# Patient Record
Sex: FEMALE | Race: WHITE | NOT HISPANIC OR LATINO | Employment: OTHER | URBAN - NONMETROPOLITAN AREA
[De-identification: names, ages, dates, MRNs, and addresses within clinical notes are randomized per-mention and may not be internally consistent; named-entity substitution may affect disease eponyms.]

---

## 2017-03-24 ENCOUNTER — OFFICE VISIT (OUTPATIENT)
Dept: FAMILY MEDICINE CLINIC | Facility: CLINIC | Age: 77
End: 2017-03-24

## 2017-03-24 VITALS
HEART RATE: 74 BPM | BODY MASS INDEX: 19.83 KG/M2 | SYSTOLIC BLOOD PRESSURE: 112 MMHG | WEIGHT: 119 LBS | DIASTOLIC BLOOD PRESSURE: 78 MMHG | HEIGHT: 65 IN | OXYGEN SATURATION: 98 %

## 2017-03-24 DIAGNOSIS — M25.511 CHRONIC RIGHT SHOULDER PAIN: ICD-10-CM

## 2017-03-24 DIAGNOSIS — G89.29 CHRONIC RIGHT SHOULDER PAIN: ICD-10-CM

## 2017-03-24 DIAGNOSIS — R60.0 LEG EDEMA, LEFT: ICD-10-CM

## 2017-03-24 DIAGNOSIS — M54.2 NECK PAIN: Primary | ICD-10-CM

## 2017-03-24 PROCEDURE — 99203 OFFICE O/P NEW LOW 30 MIN: CPT | Performed by: FAMILY MEDICINE

## 2017-03-25 PROBLEM — R60.0 LEG EDEMA, LEFT: Status: ACTIVE | Noted: 2017-03-25

## 2017-03-25 PROBLEM — M25.511 CHRONIC RIGHT SHOULDER PAIN: Status: ACTIVE | Noted: 2017-03-25

## 2017-03-25 PROBLEM — G89.29 CHRONIC RIGHT SHOULDER PAIN: Status: ACTIVE | Noted: 2017-03-25

## 2017-03-25 PROBLEM — Z85.07 H/O PANCREATIC CANCER: Status: ACTIVE | Noted: 2017-03-25

## 2017-03-25 PROBLEM — Z85.79 HISTORY OF LYMPHOMA: Status: ACTIVE | Noted: 2017-03-25

## 2017-03-25 RX ORDER — CHLORAL HYDRATE 500 MG
1000 CAPSULE ORAL
COMMUNITY

## 2017-03-25 NOTE — PROGRESS NOTES
"Subjective   Veena Gomez is a 76 y.o. female.     History of Present Illness  Mrs. Gomez presents today to establish care. She has not had a local primary care provider. She lives in [place] for 5 months out of the year and in [place] the remainder.  She has h/o lymphoma and subsequent dx of pancreatic cancer summer 2016. She underwent partial pancreatectomy and chemo-tx. She was tx'd at [facility]. Other care has been received while in [place].  Last chemo was late summer 2016. Last labs done at that time.  She has f/u CT and visit with oncology schedule for May this year in [place].    Her main concerns today are neck and shoulder pain. She c/o left sided/posterior neck pain. Aching, sometimes sharp. Moderate, sometimes severe. Seems worse after 1st waking up. Head feels \"too heavy\". Denies radiation of pain. She suspects she may have hurt it during fall at her home last fall. She bend down to go outside under the garage door, lost balance. She did not seek medical care at that time. No LOC.     In addition she has \"re-aggravated\" her right shoulder. Has ho severe OA and ?RTC tear. Is s/p distant surgery (unknown type). Has burning, tearing pain with lifting any weight, trying to raise above shoulder level with any weight. Arm feels weak due to pain. Pain radiates into upper arm.  SHe uses heat which helps some. Is afraid to take tylenol or NSAIDs.    The following portions of the patient's history were reviewed and updated as appropriate: allergies, current medications, past family history, past medical history, past social history, past surgical history and problem list.    Review of Systems   Constitutional: Positive for unexpected weight change. Negative for appetite change, fatigue and fever.   HENT: Positive for hearing loss. Negative for congestion, ear pain, nosebleeds, rhinorrhea, sneezing, sore throat, tinnitus and trouble swallowing.    Eyes: Negative for pain, discharge, redness and visual " disturbance.   Respiratory: Negative for cough, chest tightness, shortness of breath and wheezing.    Cardiovascular: Negative for chest pain, palpitations and leg swelling.   Gastrointestinal: Negative for abdominal pain, blood in stool, constipation, diarrhea, nausea and vomiting.   Endocrine: Positive for cold intolerance. Negative for heat intolerance, polydipsia and polyuria.   Genitourinary: Negative for dysuria, flank pain, frequency, hematuria and urgency.   Musculoskeletal: Positive for arthralgias, myalgias and neck pain. Negative for back pain and joint swelling.   Skin: Negative for rash and wound.   Allergic/Immunologic: Negative for food allergies.   Neurological: Negative for dizziness, tremors, seizures, syncope, speech difficulty, weakness, numbness and headaches.   Hematological: Negative for adenopathy. Does not bruise/bleed easily.   Psychiatric/Behavioral: Positive for sleep disturbance. Negative for confusion, dysphoric mood and suicidal ideas. The patient is nervous/anxious.        Objective    Vitals:    03/24/17 0947   BP: 112/78   Pulse: 74   SpO2: 98%     Body mass index is 20.11 kg/(m^2).  Last 2 weights    03/24/17  0947   Weight: 119 lb (54 kg)       Physical Exam   Constitutional: She is oriented to person, place, and time. She appears well-developed and well-nourished. She is cooperative. She does not appear ill. No distress.   HENT:   Head: Normocephalic and atraumatic.   Right Ear: Tympanic membrane, external ear and ear canal normal. Decreased hearing is noted.   Left Ear: Tympanic membrane, external ear and ear canal normal. Decreased hearing is noted.   Nose: Nose normal. No mucosal edema or rhinorrhea.   Mouth/Throat: Uvula is midline, oropharynx is clear and moist and mucous membranes are normal. Mucous membranes are not dry. No posterior oropharyngeal erythema. No tonsillar exudate.   Eyes: Conjunctivae, EOM and lids are normal.   Neck: Phonation normal. Neck supple. Normal  carotid pulses present. No thyroid mass and no thyromegaly present.   Cardiovascular: Normal rate, regular rhythm, S1 normal, S2 normal and intact distal pulses.    Pulmonary/Chest: Effort normal and breath sounds normal.   Abdominal: Soft. Bowel sounds are normal. She exhibits no distension and no mass. There is no hepatosplenomegaly. There is no tenderness.   Musculoskeletal: She exhibits no edema or deformity.        Right shoulder: She exhibits decreased range of motion (limited flexion, abduction to <120, limited internal rotation), tenderness (anterior soft tissues), bony tenderness (GH joint, AC joint) and crepitus. She exhibits normal pulse and normal strength.        Cervical back: She exhibits decreased range of motion (limited left rotation), tenderness (diffuse soft tissue), bony tenderness (C4-C7) and spasm.   + impingement signs right shoulder       Vascular Status -  Her exam exhibits no right foot edema. Her exam exhibits left foot edema (1+ to below knee).  Lymphadenopathy:     She has no cervical adenopathy.   Neurological: She is alert and oriented to person, place, and time. She has normal strength. She displays no tremor. No cranial nerve deficit or sensory deficit. Gait normal.   Reflex Scores:       Bicep reflexes are 1+ on the right side and 1+ on the left side.       Brachioradialis reflexes are 1+ on the right side and 1+ on the left side.       Patellar reflexes are 1+ on the right side and 1+ on the left side.       Achilles reflexes are 1+ on the right side and 1+ on the left side.  Skin: Skin is warm and dry. No ecchymosis and no rash noted. There is pallor.   Psychiatric: She has a normal mood and affect. Her speech is normal and behavior is normal. Judgment and thought content normal.   Nursing note and vitals reviewed.      Assessment/Plan   Veena was seen today for establish care.    Diagnoses and all orders for this visit:    Neck pain  -     Ambulatory Referral to Physical Therapy  Evaluate and treat    Chronic right shoulder pain  -     Ambulatory Referral to Physical Therapy Evaluate and treat    Leg edema, left    Suspect she has underlying OA of C spine and right shoulder. C-DDD a concern as well but no localizing findings at this time. I have reviewed diagnostic and treatment options. She is amenable to eval and tx per PT.  Patient was encouraged to keep me informed of any acute changes, lack of improvement, or any new concerning symptoms.  If minimal improvement or worsening, considering imaging.  She declines any lab work today.  Records requested from previous primary provider as well as any consulting physician, admitting hospitals, etc. Further recommendations pending review.  FU as needed.  Pt is aware of reasons to seek emergent care.  Patient voiced understanding of all instructions and denied further questions.